# Patient Record
Sex: MALE | Race: WHITE | NOT HISPANIC OR LATINO | URBAN - METROPOLITAN AREA
[De-identification: names, ages, dates, MRNs, and addresses within clinical notes are randomized per-mention and may not be internally consistent; named-entity substitution may affect disease eponyms.]

---

## 2022-09-09 ENCOUNTER — OFFICE VISIT (OUTPATIENT)
Dept: URBAN - METROPOLITAN AREA CLINIC 51 | Facility: CLINIC | Age: 47
End: 2022-09-09
Payer: COMMERCIAL

## 2022-09-09 DIAGNOSIS — H52.213 IRREGULAR ASTIGMATISM, BILATERAL: Primary | ICD-10-CM

## 2022-09-09 DIAGNOSIS — H18.603 BILATERAL KERATOCONUS: ICD-10-CM

## 2022-09-09 PROCEDURE — 92004 COMPRE OPH EXAM NEW PT 1/>: CPT | Performed by: OPTOMETRIST

## 2022-09-09 ASSESSMENT — VISUAL ACUITY
OD: 20/20
OS: 20/20

## 2022-09-09 ASSESSMENT — INTRAOCULAR PRESSURE
OS: 17
OD: 16

## 2022-09-09 NOTE — IMPRESSION/PLAN
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 Plan: KNOWN. Not a candidate for refractive procedures.  Will consult with Dr. Sari Mendez for corneal crosslinking

## 2022-09-22 ENCOUNTER — OFFICE VISIT (OUTPATIENT)
Dept: URBAN - METROPOLITAN AREA CLINIC 51 | Facility: CLINIC | Age: 47
End: 2022-09-22
Payer: COMMERCIAL

## 2022-09-22 DIAGNOSIS — H52.4 PRESBYOPIA: Primary | ICD-10-CM

## 2022-09-22 PROCEDURE — V2799 MISC VISION ITEM OR SERVICE: HCPCS | Performed by: OPTOMETRIST

## 2022-09-22 ASSESSMENT — VISUAL ACUITY
OS: 20/20
OD: 20/20

## 2022-09-22 NOTE — IMPRESSION/PLAN
Impression: Presbyopia: H52.4. Plan: Discussed that as people near age 36 and older, reading ADD is needed. 
Released MRx with ADD